# Patient Record
Sex: FEMALE | Race: BLACK OR AFRICAN AMERICAN | NOT HISPANIC OR LATINO
[De-identification: names, ages, dates, MRNs, and addresses within clinical notes are randomized per-mention and may not be internally consistent; named-entity substitution may affect disease eponyms.]

---

## 2019-01-24 PROBLEM — Z00.00 ENCOUNTER FOR PREVENTIVE HEALTH EXAMINATION: Status: ACTIVE | Noted: 2019-01-24

## 2019-02-01 ENCOUNTER — APPOINTMENT (OUTPATIENT)
Dept: OTOLARYNGOLOGY | Facility: CLINIC | Age: 44
End: 2019-02-01
Payer: MEDICAID

## 2019-02-01 VITALS
HEIGHT: 69 IN | BODY MASS INDEX: 38.8 KG/M2 | SYSTOLIC BLOOD PRESSURE: 111 MMHG | DIASTOLIC BLOOD PRESSURE: 82 MMHG | HEART RATE: 70 BPM | WEIGHT: 262 LBS

## 2019-02-01 DIAGNOSIS — Z78.9 OTHER SPECIFIED HEALTH STATUS: ICD-10-CM

## 2019-02-01 DIAGNOSIS — F17.200 NICOTINE DEPENDENCE, UNSPECIFIED, UNCOMPLICATED: ICD-10-CM

## 2019-02-01 DIAGNOSIS — Z87.09 PERSONAL HISTORY OF OTHER DISEASES OF THE RESPIRATORY SYSTEM: ICD-10-CM

## 2019-02-01 DIAGNOSIS — E04.2 NONTOXIC MULTINODULAR GOITER: ICD-10-CM

## 2019-02-01 PROCEDURE — 99204 OFFICE O/P NEW MOD 45 MIN: CPT

## 2019-02-01 RX ORDER — BECLOMETHASONE DIPROPIONATE HFA 80 UG/1
AEROSOL, METERED RESPIRATORY (INHALATION)
Refills: 0 | Status: ACTIVE | COMMUNITY

## 2019-02-01 RX ORDER — ALBUTEROL SULFATE 90 UG/1
108 (90 BASE) AEROSOL, METERED RESPIRATORY (INHALATION)
Refills: 0 | Status: ACTIVE | COMMUNITY

## 2019-02-01 NOTE — ASSESSMENT
[FreeTextEntry1] : 43F referred for further evaluation after sonogram showed enlarged thyroid. She has no compressive sx - no difficulty eating, breathing, swallowing or talking. She is clinically euthyroid. Her brother had a thyroidectomy, but otherwise there no other family h/o thyroid disease nor h/o radiation. She has asthma and is a current active smoker for years. Thyroid sonogram 12/2018 shows a diffusely heterogeneous slightly hypervascular gland, a benign-appearing 4mm nodule upper pole right lobe and a 1.3cm hypoechoic or cystic nodule along the posterior aspect of the mid to upper pole of the left lobe. Complete and comprehensive head and neck exam is otherwise unremarkable. None of the nodules on sonogram meet criteria for FNA at this time and she is asymptomatic. Will get TFTs and calcium level either way and f/u with pt thereafter. Should they be normal, RTO as needed. Smoking cessation!

## 2019-02-01 NOTE — CONSULT LETTER
[Dear  ___] : Dear  [unfilled], [Courtesy Letter:] : I had the pleasure of seeing your patient, [unfilled], in my office today. [Consult Closing:] : Thank you very much for allowing me to participate in the care of this patient.  If you have any questions, please do not hesitate to contact me. [Sincerely,] : Sincerely, [FreeTextEntry3] : Lee Hanson MD\par Department of Otolaryngology – Head and Neck Surgery\par [facility]

## 2019-02-01 NOTE — DATA REVIEWED
[de-identified] : Thyroid US 12/26/18:\par FINDINGS: \par Thyroid isthmus measures 0.3 cm which is normal.\par \par Right lobe diffusely heterogeneous measuring 5.6 x 1.7 x 2.2 cm with mild increased vascularity. Upper pole hypoechoic nodule measures 0.4 x 0.4 x 0.2 cm.\par \par Left lobe is heterogeneous with mild increased flow measuring 5.3 x 1.5 x 2.0 cm. Hypoechoic or cystic lesion posterior to the mid and upper pole measures 0.9 x 1.3 x 0.6 cm suspicious for a parathyroid adenoma.\par \par IMPRESSION:  \par 1. Diffusely heterogeneous slightly hypervascular gland consistent with thyroiditis.\par 2. Benign-appearing nodule upper pole right lobe.\par 3. Hypoechoic or cystic nodule along the posterior aspect of the mid to upper pole of the left lobe could represent a parathyroid adenoma.\par

## 2019-02-01 NOTE — HISTORY OF PRESENT ILLNESS
[de-identified] : 43F here for initial evaluation.\par \par Referred for further evaluation after sonogram showed enlarged thyroid.\par She has no difficulty eating, breathing, swallowing or talking. She is clinically euthyroid.\par Her brother had thyroidectomy, but otherwise no other family h/o thyroid disease. no h/o radiation.\par \par Thyroid sonogram 12/2018:\par 1. Diffusely heterogeneous slightly hypervascular gland consistent with thyroiditis.\par 2. Benign-appearing nodule upper pole right lobe.\par 3. Hypoechoic or cystic nodule along the posterior aspect of the mid to upper pole of the left lobe\par \par ROS otherwise unremarkable.\par

## 2019-02-04 ENCOUNTER — LABORATORY RESULT (OUTPATIENT)
Age: 44
End: 2019-02-04

## 2019-02-07 LAB
ALBUMIN SERPL ELPH-MCNC: 4.3 G/DL
ALP BLD-CCNC: 64 U/L
ALT SERPL-CCNC: 16 U/L
ANION GAP SERPL CALC-SCNC: 11 MMOL/L
AST SERPL-CCNC: 25 U/L
BILIRUB SERPL-MCNC: 0.2 MG/DL
BUN SERPL-MCNC: 13 MG/DL
CALCIUM SERPL-MCNC: 9.8 MG/DL
CHLORIDE SERPL-SCNC: 106 MMOL/L
CO2 SERPL-SCNC: 26 MMOL/L
CREAT SERPL-MCNC: 0.7 MG/DL
GLUCOSE SERPL-MCNC: 95 MG/DL
POTASSIUM SERPL-SCNC: 4.5 MMOL/L
PROT SERPL-MCNC: 7.2 G/DL
SODIUM SERPL-SCNC: 143 MMOL/L
TSH SERPL-ACNC: 4.04 UIU/ML

## 2022-04-12 ENCOUNTER — APPOINTMENT (OUTPATIENT)
Dept: OTOLARYNGOLOGY | Facility: CLINIC | Age: 47
End: 2022-04-12
Payer: MEDICAID

## 2022-04-12 DIAGNOSIS — J31.0 CHRONIC RHINITIS: ICD-10-CM

## 2022-04-12 DIAGNOSIS — J34.3 HYPERTROPHY OF NASAL TURBINATES: ICD-10-CM

## 2022-04-12 PROCEDURE — 31231 NASAL ENDOSCOPY DX: CPT

## 2022-04-12 PROCEDURE — 99214 OFFICE O/P EST MOD 30 MIN: CPT | Mod: 25

## 2022-04-12 RX ORDER — METHYLPREDNISOLONE 4 MG/1
4 TABLET ORAL
Qty: 1 | Refills: 0 | Status: ACTIVE | COMMUNITY
Start: 2022-04-12 | End: 1900-01-01

## 2022-04-12 RX ORDER — LEVOCETIRIZINE DIHYDROCHLORIDE 5 MG/1
5 TABLET ORAL DAILY
Qty: 30 | Refills: 1 | Status: ACTIVE | COMMUNITY
Start: 2022-04-12 | End: 1900-01-01

## 2022-04-12 RX ORDER — FLUTICASONE PROPIONATE 50 UG/1
50 SPRAY, METERED NASAL DAILY
Qty: 1 | Refills: 5 | Status: ACTIVE | COMMUNITY
Start: 2022-04-12 | End: 1900-01-01

## 2022-04-12 RX ORDER — AZELASTINE HYDROCHLORIDE 137 UG/1
137 SPRAY, METERED NASAL DAILY
Qty: 1 | Refills: 5 | Status: ACTIVE | COMMUNITY
Start: 2022-04-12 | End: 1900-01-01

## 2022-04-12 RX ORDER — CETIRIZINE HYDROCHLORIDE 10 MG/1
10 TABLET, COATED ORAL
Qty: 60 | Refills: 4 | Status: ACTIVE | COMMUNITY
Start: 2022-04-12 | End: 1900-01-01

## 2022-04-12 NOTE — PROCEDURE
[FreeTextEntry3] : Nasal Endoscopy:\par severe right inferior turbinate hypertrophy\par clear mucus\par choana clear\par no mucopus or polyps

## 2022-04-12 NOTE — CONSULT LETTER
[Dear  ___] : Dear  [unfilled], [Courtesy Letter:] : I had the pleasure of seeing your patient, [unfilled], in my office today. [Consult Closing:] : Thank you very much for allowing me to participate in the care of this patient.  If you have any questions, please do not hesitate to contact me. [Sincerely,] : Sincerely, [FreeTextEntry3] : Lee Hanson MD\par Department of Otolaryngology - Head and Neck Surgery\par [facility]

## 2022-04-12 NOTE — ASSESSMENT
[FreeTextEntry1] : 46F here in followup. For years, she c/o constellation of sx including itchy/watery eyes/nose, itchy throat, ear fullness, nasal congestion/obstruction which can alternate, though the right is usually worse. Over the past 2-3 months her sx have been particularly bothersome. She always thought she has allergies, but has never been tested. She takes 3-4 cetirizine pills daily; initially they helped., but she has been taking more because she does not get the same benefit she initially did. She is not on regular nasal sprays. Some strong scents can trigger deep itch in throat w coughing and worsening of sx. She also has childhood h/o asthma, takes albuterol as needed, which now has been a few times a week since above sx have been worse. She was hospitalized in 1990s for asthma attack; never saw pulmonologist. She smokes 10cig/d, has ~20pk/yrs. Lastly, she snores at night. On exam, the oropharynx is crowded. Nasal endoscopy shwos severe right inferior turbinate hypertrophy and scattered clear mucus.\par She has a chronic rhinitis w turbinate hypertrophy, likely allergic, as are majority of other sx as above. There is no e/o sinusitis. She needs better and more consistent medical control. For now, will give medrol taper for some acute sx relief. Will start flonase/astelin. Will continue cetirizine in AM and add xyzal in PM. She will see allergist for formal evaluation and RTO thereafter.

## 2022-04-12 NOTE — HISTORY OF PRESENT ILLNESS
[de-identified] : 46F here in followup.\par \par For years, she c/o constellation of sx including itchy/watery eyes/nose, itchy throat, ear fullness, nasal congestion/obstruction which can alternate, though the right is usually worse. Over the past 2-3 months her sx have been particularly bothersome.\par She always thought she has allergies, but has never been tested. She takes 3-4 cetirizine pills daily; initially they helped., but she has been taking more because she does not get the same benefit she initially did. She is not on regular nasal sprays. Some strong scents can trigger deep itch in throat w coughing and worsening of sx.\par She also has childhood h/o asthma, takes albuterol as needed, which now has been a few times a week since above sx have been worse. Was hospitalized in 1990s for asthma attack; never saw pulmonologist.\par Smokes 10cig/d, has ~20pk/yrs.\par Also snores at night.\par \par ROS otherwise unremarkable.

## 2022-04-12 NOTE — PHYSICAL EXAM
[Nasal Endoscopy Performed] : nasal endoscopy was performed, see procedure section for findings [Midline] : trachea located in midline position [Normal] : no rashes [de-identified] : soft, flat [de-identified] : crowded opx

## 2022-10-06 ENCOUNTER — APPOINTMENT (OUTPATIENT)
Dept: OTOLARYNGOLOGY | Facility: CLINIC | Age: 47
End: 2022-10-06

## 2022-12-09 ENCOUNTER — APPOINTMENT (OUTPATIENT)
Dept: OTOLARYNGOLOGY | Facility: CLINIC | Age: 47
End: 2022-12-09